# Patient Record
Sex: FEMALE | Race: WHITE
[De-identification: names, ages, dates, MRNs, and addresses within clinical notes are randomized per-mention and may not be internally consistent; named-entity substitution may affect disease eponyms.]

---

## 2018-04-29 NOTE — PDGENHP
History and Physical


History and Physical: 





Assessment and Plan:


1. Dysmenorrhea


Although Lianne had a laparoscopy a year and a half ago which was reportedly 

unremarkable her symptoms are certainly concerning for endometriosis.  She also 

may have an isthmocele causing her irregular spotting.  She has failed 

conservative management of the symptoms.  After lengthy discussion we reviewed 

all conservative and surgical options.  She likely would best be served by a 

repeat laparoscopy with very close examination for pelvic endometriosis.  I 

think she also would benefit from a hysteroscopy to look at the  

section scar.  I likely will perform the hysteroscopy first in case anything 

needs to be treated laparoscopically.





2. Dyschezia





3. Metrorrhagia





Subjective:





Patient ID: Lianne Howard is a 22 y.o. female who presents to [facility] for possible endometriosis.





RICK Abdalla is a 22-year-old para 2 woman who presents to discuss endometriosis.  

She is using an IUD and continuous birth control pills for contraception.  She 

lives in [place] and is a stay-at-home mother.  She has had pelvic 

pain since the birth of her second child in .  She underwent a laparoscopy 

in 2016 she was found to have scarring from her  section to 

the anterior abdominal wall.  No obvious endometriosis was seen by that 

surgeon.  Before using the continuous birth control pill she had some mild 

dysmenorrhea with little low back pain and cramping.  She has excruciating deep 

dyspareunia causing left lower quadrant pain.  She has vomited once.  She has 

spotting after intercourse.  She also has dyschezia which wraps around her back 

and down her rectum.  She has pain with urination when her bladder is full.





Her cycles are regular she will bleed for 5 days followed by 14 days of 

spotting.  She will use a super tampon every 3 hours.  She also has 

intermittent spotting throughout the month.  The pain is worse before her 

menses which she describes as a pulling stabbing sensation in the left lower 

quadrant.  It persisted during her menses.





She used the Cristian birth control pill for 8 months with no help.  Her bleeding 

did not lessen now.  However she had breakthrough bleeding on the continuous 

pill.  She now has a ParaGard IUD.  She underwent a CT scan of the chest 

abdomen and pelvis in January of this year which was unremarkable.





PastMedicalHistory


Past Medical History:


Diagnosis   Date


   ADHD (attention deficit hyperactivity disorder)   


   ADHD (attention deficit hyperactivity disorder)   


   Allergy to pollen   


   Anti-phospholipid syndrome (HC code)   


   R/T lupus


   Asthma   


   only uses inhaler 1-2 times per year when allergies act up


   Autoimmune disease (HC code)   


   Possible


   BRCA1 gene mutation negative   10/2017


   31 gene "riscover" hereditary cancer panel through [facility] showed no 

clinically significant mutations: VUS/likely benign variant identified in BRCA2


   Clotting disorder (HC code)   


   Gastrointestinal disorder   


   GERD (gastroesophageal reflux disease)   


   Lupus   


   questionable


   PCOS (polycystic ovarian syndrome)   


   PCOS (polycystic ovarian syndrome)   


   Phlebitis and thrombophlebitis   


   PONV (postoperative nausea and vomiting)   


   Positive TB test   


   never has had active disease


   SLE (systemic lupus erythematosus) (HC code)   


   Strep pharyngitis   


   Trauma   


   Urinary tract infection   


   Varicella   








PastSurgicalHistory


Past Surgical History:


Procedure   Laterality   Date


   adhesion removal      


    SECTION      


   , 


   CHOLECYSTECTOMY      


   Lap


   PELVIC LAPAROSCOPY      2016


   WISDOM TOOTH EXTRACTION      











CURRENT MEDICATIONS: 


Current Outpatient Prescriptions


Medication   Sig


   albuterol HFA 90 mcg/actuation inhaler   Inhale 1-2 puffs into the lungs 

every 4 hours.


   ibuprofen (ADVIL,MOTRIN) 800 mg tablet   Take 1 tablet by mouth 3 times 

daily (with meals) for Pain. Take with food.


   lisdexamfetamine (VYVANSE) 40 mg capsule   Take 40 mg by mouth every morning 

for ATTENTION-DEFICIT HYPERACTIVITY DISORDER.


   ondansetron (ZOFRAN-ODT) 4 mg disintegrating tablet   Take 1 tablet by mouth 

every 6 hours as needed for Nausea or Vomiting for vomiting.


   oxyCODONE (ROXICODONE) 10 mg Tab IR tablet   


   CRISTIAN, 28, 3-0.02 mg per tablet   


   tiZANidine (ZANAFLEX) 2 mg tablet   





No current facility-administered medications for this visit. 








ALLERGIES: Morphine; Adhesive; Bactrim [sulfamethoxazole-trimethoprim]; 

Donnatal [phenobarb-hyoscy-atropine-scop]; and Sulfa (sulfonamide antibiotics)





I have reviewed, verified and agree with the past medical, surgical, birth, 

family, social  and ROS history as documented by the RN today.





Objective:


Vital Signs: 


Visit Vitals


BP   106/70


Pulse   86


Temp   37.1 C (98.7 F) (Temporal Artery)


Resp   14


Ht   1.556 m (5' 1.25")


Wt   73.9 kg (163 lb)


LMP   2018


SpO2   97%


BMI   30.55 kg/m








Physical Exam


Gen: This is an alert, well developed woman in no distress.


Neuro: She moves all extremities.


Psych: She is appropriate, oriented, with normal affect.


Neck: No thyroid enlargement, adenopathy, or tenderness.


Lungs: Clear to ascultation, no wheezes or rales.


Heart: Regular rate and rhythm without obvious murmurs.


Abdomen: Soft, non-tender, without guarding, rebound, or masses.


Extremities: No edema or cyanosis.


Pelvic: Normal external genitalia. Non-gaping introitus, vagina without 

discharge, adequately estrogenized, no significant prolapse. Cervix without 

lesions or discharge. Uterus normal sized, mobile. Adnexa non-tender without 

enlargement.  She is tender surrounding the cervix and in the posterior cul-de-

sac.  She has exquisite tenderness on the left aspect of the rectum which feels 

somewhat like her dyschezia pain.  She also has left adnexal tenderness.





DATA:


I have reviewed the pertinent medical records. 


PELVIC ULTRASOUND








Indication: Pelvic pain.





Findings: 





The uterus is anterior and measures 8.7 x 4.2 x 6.1 cm.  An IUD is seen at the 

fundus with the arms in a horizontal position.  There is a slight irregularity 

of the endometrium at the  section scar.  There is a possibility this 

may represent an isthmocele.








The right ovary measures 2.9 x 2.0 cm.  The left ovary measures 3.8 x 2.7 cm.





Impression: 





Relatively unremarkable pelvic ultrasound with slight irregularity of the 

endometrium at the  section scar.  There is a possibility this may 

represent an isthmocele.





  


TIME/COMMUNICATION:


I personally spent a total of 60 minutes. Of that 40 minutes was counseling/

coordination of patient's care. See my note above for details.





Cristobal Greenfield MD


Board Certified Female Pelvic Medicine and Reconstructive Surgery


Director of Minimally Invasive Gynecologic Surgery, [facility] Surgeon in Minimally Invasive Gynecologic Surgery


[facility] Surgeon in Robotic Surgery

## 2018-05-02 ENCOUNTER — HOSPITAL ENCOUNTER (OUTPATIENT)
Dept: HOSPITAL 80 - FSGY | Age: 23
Discharge: HOME | End: 2018-05-02
Attending: OBSTETRICS & GYNECOLOGY
Payer: COMMERCIAL

## 2018-05-02 VITALS — SYSTOLIC BLOOD PRESSURE: 90 MMHG | DIASTOLIC BLOOD PRESSURE: 66 MMHG

## 2018-05-02 DIAGNOSIS — N80.3: ICD-10-CM

## 2018-05-02 DIAGNOSIS — R10.2: ICD-10-CM

## 2018-05-02 DIAGNOSIS — K21.9: ICD-10-CM

## 2018-05-02 DIAGNOSIS — N80.1: Primary | ICD-10-CM

## 2018-05-02 DIAGNOSIS — N92.1: ICD-10-CM

## 2018-05-02 DIAGNOSIS — N94.6: ICD-10-CM

## 2018-05-02 DIAGNOSIS — J45.909: ICD-10-CM

## 2018-05-02 DIAGNOSIS — Z88.2: ICD-10-CM

## 2018-05-02 DIAGNOSIS — E28.2: ICD-10-CM

## 2018-05-02 DIAGNOSIS — F90.9: ICD-10-CM

## 2018-05-02 DIAGNOSIS — N94.12: ICD-10-CM

## 2018-05-02 DIAGNOSIS — D68.61: ICD-10-CM

## 2018-05-02 PROCEDURE — 8E0Y4CZ ROBOTIC ASSISTED PROCEDURE OF LOWER EXTREMITY, PERCUTANEOUS ENDOSCOPIC APPROACH: ICD-10-PCS | Performed by: OBSTETRICS & GYNECOLOGY

## 2018-05-02 PROCEDURE — 0UB28ZX EXCISION OF BILATERAL OVARIES, VIA NATURAL OR ARTIFICIAL OPENING ENDOSCOPIC, DIAGNOSTIC: ICD-10-PCS | Performed by: OBSTETRICS & GYNECOLOGY

## 2018-05-02 PROCEDURE — 0UBF8ZX EXCISION OF CUL-DE-SAC, VIA NATURAL OR ARTIFICIAL OPENING ENDOSCOPIC, DIAGNOSTIC: ICD-10-PCS | Performed by: OBSTETRICS & GYNECOLOGY

## 2018-05-02 PROCEDURE — 0UJD8ZZ INSPECTION OF UTERUS AND CERVIX, VIA NATURAL OR ARTIFICIAL OPENING ENDOSCOPIC: ICD-10-PCS | Performed by: OBSTETRICS & GYNECOLOGY

## 2018-05-02 PROCEDURE — 0US28ZZ REPOSITION BILATERAL OVARIES, VIA NATURAL OR ARTIFICIAL OPENING ENDOSCOPIC: ICD-10-PCS | Performed by: OBSTETRICS & GYNECOLOGY

## 2018-05-02 RX ADMIN — PROMETHAZINE HYDROCHLORIDE PRN MG: 25 INJECTION INTRAMUSCULAR; INTRAVENOUS at 11:30

## 2018-05-02 RX ADMIN — HYDROMORPHONE HYDROCHLORIDE PRN MG: 2 INJECTION INTRAMUSCULAR; INTRAVENOUS; SUBCUTANEOUS at 11:51

## 2018-05-02 RX ADMIN — HYDROMORPHONE HYDROCHLORIDE PRN MG: 2 INJECTION INTRAMUSCULAR; INTRAVENOUS; SUBCUTANEOUS at 11:26

## 2018-05-02 RX ADMIN — HYDROMORPHONE HYDROCHLORIDE PRN MG: 2 INJECTION INTRAMUSCULAR; INTRAVENOUS; SUBCUTANEOUS at 11:14

## 2018-05-02 RX ADMIN — PROMETHAZINE HYDROCHLORIDE PRN MG: 25 INJECTION INTRAMUSCULAR; INTRAVENOUS at 11:14

## 2018-05-02 RX ADMIN — HYDROMORPHONE HYDROCHLORIDE PRN MG: 2 INJECTION INTRAMUSCULAR; INTRAVENOUS; SUBCUTANEOUS at 11:41

## 2018-05-02 NOTE — PDANEPAE
ANE Past Medical History





- Cardiovascular History


Hx Hypertension: No


Hx Arrhythmias: No


Hx Chest Pain: No


Hx Coronary Artery / Peripheral Vascular Disease: No


Hx CHF / Valvular Disease: No


Hx Palpitations: No





- Pulmonary History


Hx COPD: No


Hx Asthma/Reactive Airway Disease: Yes


Hx Recent Upper Respiratory Infection: No


Hx Oxygen in Use at Home: No


Hx Sleep Apnea: No


Sleep Apnea Screening Result - Last Documented: Negative


Pulmonary History Comment: HX ASTHMA SEASONAL - INHALER





- Neurologic History


Hx Cerebrovascular Accident: No


Hx Seizures: No


Hx Dementia: No


Neurologic History Comment: HAD SEIZURE RELATED TO OVERDOSE OF DONNATAL TWO YRS 

AGO - NO FURTHER EPISODES





- Endocrine History


Hx Diabetes: No





- Renal History


Hx Renal Disorders: Yes


Renal History Comment: ENDOMETRIOSIS ATTACHED TO BLADDER





- Liver History


Hx Hepatic Disorders: No


Hepatic History Comment: PRACHI





- Neurological & Psychiatric Hx


Hx Neurological and Psychiatric Disorders: Yes


Neurological / Psychiatric History Comment: ADHD





- Cancer History


Hx Cancer: No





- Congenital Disorder History


Hx Congenital Disorders: No





- GI History


Hx Gastrointestinal Disorders: No


Gastrointestinal History Comment: HERNIA UMB AND ABD WALL





- Other Health History


Other Health History: SKIN RASHES W/SENSITIVE SKIN





- Chronic Pain History


Chronic Pain: Yes (PAIN MGT FOR ENDOMETRIOSIS, ARTHRITIS BACK & KNEE VASILIY)





- Surgical History


Prior Surgeries: C SECTION X2.  CHOLECYSTECTOMY.  LAPAROSCOPY.  COLONOSCOPY





ANE Review of Systems


Review of Systems: 








- Exercise capacity


METS (RN): 5 METS





ANE Patient History





- Allergies


Allergies/Adverse Reactions: 








morphine Allergy (Verified 04/09/18 14:28)


 EXCESSIVE SWEATING, DIFFICULTY BREATHING


Sulfa (Sulfonamide Antibiotics) Allergy (Verified 04/09/18 14:28)


 PEELING BLISTERS, RASH CHEST, TONGUE SWELLING








- Home Medications


Home Medications: 








Excedrin Migraine Geltab  04/09/18 [Last Taken 3 Months Ago ~02/02/18]


Motrin (*)  04/09/18 [Last Taken 2 Weeks Ago ~04/18/18]


Oxycodone HCl  04/09/18 [Last Taken 04/28/18]


Oxycontin  04/09/18 [Last Taken 04/28/18]


Valium  04/09/18 [Last Taken 04/28/18]


Vyvanse  04/09/18 [Last Taken 05/01/18]








- NPO status


NPO Since - Liquids (Date): 05/01/18


NPO Since - Liquids (Time): 23:30


NPO Since - Solids (Date): 05/01/18


NPO Since - Solids (Time): 23:30





- Smoking Hx


Smoking Status: Never smoked





- Family Anes Hx


Family Hx Anesthesia Complications: NEG





ANE Labs/Vital Signs





- Vital Signs


Blood Pressure: 121/80


Heart Rate: 91


Respiratory Rate: 20


O2 Sat (%): 98


Height: 154.94 cm


Weight: 73.482 kg





ANE Physical Exam





- Airway


Neck exam: FROM (small plastic hook on the inside of teeth right upper lateral)


Mallampati Score: Class 1


Mouth exam: normal dental/mouth exam





- Pulmonary


Pulmonary: no respiratory distress, no rales or rhonchi, clear to auscultation





- Cardiovascular


Cardiovascular: regular rate and rhythym, no murmur, rub, or gallop





- ASA Status


ASA Status: II





ANE Anesthesia Plan


Anesthesia Plan: general endotracheal anesthesia

## 2018-05-02 NOTE — GOP
[f rep st]



                                                                OPERATIVE REPORT





DATE OF OPERATION:  2018



SURGEON:  Cristobal Greenfield MD



ASSISTANT:  Marisa Gunn CFA.



ANESTHESIA:  General.



PREOPERATIVE DIAGNOSIS:  

1.  Metrorrhagia.

2.  Endometriosis.

3.  Dysmenorrhea.

4.  Cyclic pelvic pain.



POSTOPERATIVE DIAGNOSIS:  

1.  Metrorrhagia.

2.  Endometriosis.

3.  Dysmenorrhea.

4.  Cyclic pelvic pain.



PROCEDURE PERFORMED:  

1.  Diagnostic hysteroscopy.

2.  Robotic excision of endometriosis and posterior cul-de-sac, bilateral ovarian fossae.

3.  Bilateral ureterolysis.

4.  Bilateral ovariopexy.



FINDINGS:  



SPECIMENS:  Pelvic peritoneum with endometriosis.



ESTIMATED BLOOD LOSS:  Scant.



DESCRIPTION OF PROCEDURE:  The patient was taken to the operating room where she was identified.  Gen
eral anesthesia was administered and found to be adequate.  She was placed in the lithotomy position 
and prepared and draped in normal sterile fashion.  A Che catheter was placed in her bladder. 



The diagnostic hysteroscope was advanced into the endometrial cavity.  The patient had an IUD in appr
opriate position.  No obvious pathology was seen.  The hysteroscope was removed and a Hulka tenaculum
 placed. 



A 1 cm infraumbilical incision was made with a scalpel.  The Veress needle with CO2 gas flowing was a
dvanced into the peritoneal cavity.  The abdomen was then insufflated with carbon dioxide gas.  The 1
2 mm trocar followed by the laparoscope were then inserted.  The upper abdomen was unremarkable.  The
re was no evidence of endometriosis on either diaphragm, liver, stomach, or gallbladder.  Two lateral
 ports were placed in the right and one on the left under direct visualization.  She then was placed 
in Trendelenburg position and da Edgard robot docked on the left side.  The instruments were then brou
ght into the abdominal cavity under direct visualization.  She was found to have endometriosis in the
 posterior cul-de-sac and both ovarian fossae overlying both ureters.  There were only 2 very small l
esions on the left ovary.  The anterior cul-de-sac had adhesions from her prior  section but 
no endometriosis.  The lesions on the left ovary were treated.  A bilateral ovariopexy was then perfo
rmed due to the cyclic pelvic pain.  Each ovary was sutured to the ipsilateral round ligaments near t
he internal inguinal ring with 3-0 Vicryl Rapide suture.  The posterior cul-de-sac peritoneum from th
e distal rectum up to the cervix and laterally to the uterosacral ligaments was completely excised.  
A bilateral ureterolysis was required to treat the endometriosis overlying both ureters.  The periton
eum at the pelvic brim was incised.  The ureters were gently dissected free and lateralized off the o
verlying peritoneum and endometriosis, from the pelvic brim down to the uterine arteries.  Once this 
was accomplished, the entire ovarian fossa peritoneum was completely excised.  The pelvis was then ir
rigated with sterile saline, and hemostasis was present.  The robot was then undocked.  The fascia wa
s closed with 0 Vicryl, skin with 4-0 Monocryl and surgical adhesive.  Anesthesia was reversed.  The 
patient was taken to the PACU awake, in stable condition.



COMPLICATIONS:  None.



DISPOSITION:  Patient stable to PACU.





Job #:  [id number]